# Patient Record
Sex: MALE | Race: WHITE | NOT HISPANIC OR LATINO | Employment: OTHER | URBAN - METROPOLITAN AREA
[De-identification: names, ages, dates, MRNs, and addresses within clinical notes are randomized per-mention and may not be internally consistent; named-entity substitution may affect disease eponyms.]

---

## 2018-09-25 VITALS — HEIGHT: 71 IN | BODY MASS INDEX: 39.76 KG/M2 | WEIGHT: 284 LBS

## 2019-01-01 ENCOUNTER — DOCUMENTATION ONLY (OUTPATIENT)
Dept: TRANSPLANT | Facility: CLINIC | Age: 49
End: 2019-01-01

## 2019-01-01 ENCOUNTER — TELEPHONE (OUTPATIENT)
Dept: TRANSPLANT | Facility: CLINIC | Age: 49
End: 2019-01-01

## 2019-01-01 ENCOUNTER — HOSPITAL ENCOUNTER (OUTPATIENT)
Dept: RADIOLOGY | Facility: HOSPITAL | Age: 49
Discharge: HOME | End: 2019-06-20
Attending: NURSE PRACTITIONER
Payer: MEDICARE

## 2019-01-01 ENCOUNTER — SOCIAL WORK (OUTPATIENT)
Dept: TRANSPLANT | Facility: CLINIC | Age: 49
End: 2019-01-01

## 2019-01-01 ENCOUNTER — TELEPHONE (OUTPATIENT)
Dept: INFECTIOUS DISEASES | Facility: HOSPITAL | Age: 49
End: 2019-01-01

## 2019-01-01 ENCOUNTER — HOSPITAL ENCOUNTER (OUTPATIENT)
Dept: RADIOLOGY | Facility: HOSPITAL | Age: 49
Discharge: HOME | End: 2019-08-12
Attending: NURSE PRACTITIONER
Payer: MEDICARE

## 2019-01-01 ENCOUNTER — CLINICAL SUPPORT (OUTPATIENT)
Dept: INFECTIOUS DISEASES | Facility: CLINIC | Age: 49
End: 2019-01-01
Payer: MEDICARE

## 2019-01-01 ENCOUNTER — OFFICE VISIT (OUTPATIENT)
Dept: TRANSPLANT | Facility: CLINIC | Age: 49
End: 2019-01-01
Payer: MEDICARE

## 2019-01-01 VITALS
OXYGEN SATURATION: 99 % | HEART RATE: 106 BPM | RESPIRATION RATE: 18 BRPM | DIASTOLIC BLOOD PRESSURE: 75 MMHG | BODY MASS INDEX: 32.73 KG/M2 | WEIGHT: 228.63 LBS | HEIGHT: 70 IN | SYSTOLIC BLOOD PRESSURE: 115 MMHG | TEMPERATURE: 98 F

## 2019-01-01 DIAGNOSIS — I10 ESSENTIAL HYPERTENSION: ICD-10-CM

## 2019-01-01 DIAGNOSIS — Z79.4 TYPE 2 DIABETES MELLITUS WITH COMPLICATION, WITH LONG-TERM CURRENT USE OF INSULIN: ICD-10-CM

## 2019-01-01 DIAGNOSIS — Z01.818 PRE-TRANSPLANT EVALUATION FOR CHRONIC KIDNEY DISEASE: Primary | ICD-10-CM

## 2019-01-01 DIAGNOSIS — Z76.82 ORGAN TRANSPLANT CANDIDATE: ICD-10-CM

## 2019-01-01 DIAGNOSIS — Z01.818 PRE-TRANSPLANT EVALUATION FOR CHRONIC KIDNEY DISEASE: ICD-10-CM

## 2019-01-01 DIAGNOSIS — Z51.89 ENCOUNTER FOR BLOOD TRANSFUSION: ICD-10-CM

## 2019-01-01 DIAGNOSIS — E11.21 DIABETIC NEPHROPATHY ASSOCIATED WITH TYPE 2 DIABETES MELLITUS: ICD-10-CM

## 2019-01-01 DIAGNOSIS — Z76.82 ORGAN TRANSPLANT CANDIDATE: Primary | ICD-10-CM

## 2019-01-01 DIAGNOSIS — R76.11 POSITIVE PPD: ICD-10-CM

## 2019-01-01 DIAGNOSIS — I82.401 ACUTE DEEP VEIN THROMBOSIS (DVT) OF RIGHT LOWER EXTREMITY, UNSPECIFIED VEIN: ICD-10-CM

## 2019-01-01 DIAGNOSIS — E11.8 TYPE 2 DIABETES MELLITUS WITH COMPLICATION, WITH LONG-TERM CURRENT USE OF INSULIN: ICD-10-CM

## 2019-01-01 DIAGNOSIS — N18.6 ESRD (END STAGE RENAL DISEASE): ICD-10-CM

## 2019-01-01 PROCEDURE — 72170 X-RAY EXAM OF PELVIS: CPT | Mod: TC,TXP

## 2019-01-01 PROCEDURE — 93978 US DOPP ILIACS BILATERAL: ICD-10-PCS | Mod: 26,TXP,, | Performed by: RADIOLOGY

## 2019-01-01 PROCEDURE — 99215 OFFICE O/P EST HI 40 MIN: CPT | Mod: PBBFAC,25,TXP | Performed by: INTERNAL MEDICINE

## 2019-01-01 PROCEDURE — 74176 CT ABDOMEN PELVIS WITHOUT CONTRAST: ICD-10-PCS | Mod: 26,TXP,, | Performed by: RADIOLOGY

## 2019-01-01 PROCEDURE — 71046 XR CHEST PA AND LATERAL: ICD-10-PCS | Mod: 26,TXP,, | Performed by: RADIOLOGY

## 2019-01-01 PROCEDURE — 99203 OFFICE O/P NEW LOW 30 MIN: CPT | Mod: S$PBB,TXP,, | Performed by: TRANSPLANT SURGERY

## 2019-01-01 PROCEDURE — 71046 X-RAY EXAM CHEST 2 VIEWS: CPT | Mod: TC,TXP

## 2019-01-01 PROCEDURE — 74176 CT ABD & PELVIS W/O CONTRAST: CPT | Mod: TC,TXP

## 2019-01-01 PROCEDURE — G0009 ADMIN PNEUMOCOCCAL VACCINE: HCPCS | Mod: PBBFAC,TXP

## 2019-01-01 PROCEDURE — 76770 US EXAM ABDO BACK WALL COMP: CPT | Mod: TC,TXP

## 2019-01-01 PROCEDURE — 99204 OFFICE O/P NEW MOD 45 MIN: CPT | Mod: S$PBB,TXP,, | Performed by: PHYSICIAN ASSISTANT

## 2019-01-01 PROCEDURE — 99203 PR OFFICE/OUTPT VISIT, NEW, LEVL III, 30-44 MIN: ICD-10-PCS | Mod: S$PBB,TXP,, | Performed by: TRANSPLANT SURGERY

## 2019-01-01 PROCEDURE — 99205 PR OFFICE/OUTPT VISIT, NEW, LEVL V, 60-74 MIN: ICD-10-PCS | Mod: S$PBB,TXP,, | Performed by: INTERNAL MEDICINE

## 2019-01-01 PROCEDURE — 99204 PR OFFICE/OUTPT VISIT, NEW, LEVL IV, 45-59 MIN: ICD-10-PCS | Mod: S$PBB,TXP,, | Performed by: PHYSICIAN ASSISTANT

## 2019-01-01 PROCEDURE — 72170 XR PELVIS ROUTINE AP: ICD-10-PCS | Mod: 26,TXP,, | Performed by: RADIOLOGY

## 2019-01-01 PROCEDURE — 76770 US EXAM ABDO BACK WALL COMP: CPT | Mod: 26,TXP,, | Performed by: RADIOLOGY

## 2019-01-01 PROCEDURE — 93978 VASCULAR STUDY: CPT | Mod: TC,TXP

## 2019-01-01 PROCEDURE — 99999 PR PBB SHADOW E&M-EST. PATIENT-LVL V: ICD-10-PCS | Mod: PBBFAC,TXP,, | Performed by: INTERNAL MEDICINE

## 2019-01-01 PROCEDURE — 93978 VASCULAR STUDY: CPT | Mod: 26,TXP,, | Performed by: RADIOLOGY

## 2019-01-01 PROCEDURE — 76770 US RETROPERITONEAL COMPLETE: ICD-10-PCS | Mod: 26,TXP,, | Performed by: RADIOLOGY

## 2019-01-01 PROCEDURE — 99205 OFFICE O/P NEW HI 60 MIN: CPT | Mod: S$PBB,TXP,, | Performed by: INTERNAL MEDICINE

## 2019-01-01 PROCEDURE — 90471 IMMUNIZATION ADMIN: CPT | Mod: PBBFAC,TXP

## 2019-01-01 PROCEDURE — 74176 CT ABD & PELVIS W/O CONTRAST: CPT | Mod: 26,TXP,, | Performed by: RADIOLOGY

## 2019-01-01 PROCEDURE — 71046 X-RAY EXAM CHEST 2 VIEWS: CPT | Mod: 26,TXP,, | Performed by: RADIOLOGY

## 2019-01-01 PROCEDURE — 72170 X-RAY EXAM OF PELVIS: CPT | Mod: 26,TXP,, | Performed by: RADIOLOGY

## 2019-01-01 PROCEDURE — 99999 PR PBB SHADOW E&M-EST. PATIENT-LVL V: CPT | Mod: PBBFAC,TXP,, | Performed by: INTERNAL MEDICINE

## 2019-01-01 RX ORDER — AMLODIPINE BESYLATE 10 MG/1
10 TABLET ORAL DAILY
Refills: 3 | COMMUNITY
Start: 2019-01-01

## 2019-01-01 RX ORDER — INSULIN ASPART 100 [IU]/ML
INJECTION, SOLUTION INTRAVENOUS; SUBCUTANEOUS
COMMUNITY

## 2019-01-01 RX ORDER — PROMETHAZINE HYDROCHLORIDE 12.5 MG/1
TABLET ORAL
Refills: 0 | COMMUNITY
Start: 2019-04-04

## 2019-01-01 RX ORDER — FLUOXETINE HYDROCHLORIDE 40 MG/1
40 CAPSULE ORAL DAILY
Refills: 3 | COMMUNITY
Start: 2019-01-01

## 2019-01-01 RX ORDER — PREGABALIN 100 MG/1
CAPSULE ORAL
Refills: 0 | COMMUNITY
Start: 2019-01-01

## 2019-01-01 RX ORDER — CALCIUM ACETATE 667 MG/1
3 CAPSULE ORAL
COMMUNITY
Start: 2019-01-01

## 2019-01-01 RX ORDER — GABAPENTIN 300 MG/1
300 CAPSULE ORAL DAILY
COMMUNITY

## 2019-01-01 RX ORDER — CALCITRIOL 0.5 UG/1
0.5 CAPSULE ORAL DAILY
Refills: 99 | COMMUNITY
Start: 2019-04-03

## 2019-01-01 RX ORDER — METOPROLOL TARTRATE 50 MG/1
50 TABLET ORAL 2 TIMES DAILY
COMMUNITY

## 2019-01-01 RX ORDER — PANTOPRAZOLE SODIUM 40 MG/1
40 TABLET, DELAYED RELEASE ORAL DAILY
Refills: 5 | COMMUNITY
Start: 2019-01-01

## 2019-01-01 RX ORDER — HYDROCODONE BITARTRATE AND ACETAMINOPHEN 10; 325 MG/1; MG/1
1 TABLET ORAL 2 TIMES DAILY
Refills: 0 | COMMUNITY
Start: 2019-01-01

## 2019-01-01 RX ORDER — FUROSEMIDE 80 MG/1
80 TABLET ORAL 2 TIMES DAILY
Refills: 3 | COMMUNITY
Start: 2019-01-01

## 2019-04-18 ENCOUNTER — TELEPHONE (OUTPATIENT)
Dept: TRANSPLANT | Facility: CLINIC | Age: 49
End: 2019-04-18

## 2019-06-11 PROBLEM — N18.6 ESRD (END STAGE RENAL DISEASE) ON DIALYSIS: Status: ACTIVE | Noted: 2019-01-01

## 2019-06-11 PROBLEM — Z99.2 ESRD (END STAGE RENAL DISEASE) ON DIALYSIS: Status: ACTIVE | Noted: 2019-01-01

## 2019-06-11 PROBLEM — Z79.4 TYPE 2 DIABETES MELLITUS WITH COMPLICATION, WITH LONG-TERM CURRENT USE OF INSULIN: Status: ACTIVE | Noted: 2019-01-01

## 2019-06-11 PROBLEM — I10 ESSENTIAL HYPERTENSION: Status: ACTIVE | Noted: 2019-01-01

## 2019-06-11 PROBLEM — E11.8 TYPE 2 DIABETES MELLITUS WITH COMPLICATION, WITH LONG-TERM CURRENT USE OF INSULIN: Status: ACTIVE | Noted: 2019-01-01

## 2019-06-20 PROBLEM — E11.21 DIABETIC NEPHROPATHY ASSOCIATED WITH TYPE 2 DIABETES MELLITUS: Status: ACTIVE | Noted: 2019-01-01

## 2019-06-20 NOTE — PROGRESS NOTES
"   Transplant Nephrology  Kidney Transplant Recipient Evaluation    Referring Physician: Alexey Almazan  Current Nephrologist: Alexey Almazan    Subjective:   CC:  Initial evaluation of kidney transplant candidacy.    HPI:  Mr. Oneill is a 49 y.o. year old White male who has presented to be evaluated as a potential kidney transplant recipient.  He has ESRD secondary to diabetic nephropathy.  Patient is currently on peritoneal dialysis started on 1/2016. Patient is dialyzing on cyclic peritoneal dialysis.  Patient reports that he is tolerating dialysis well. . He has a PD catheter for dialysis access.     Patient he was diagnosed with DM at age 17. He was on medications for 20+ yrs, insulin dependent since 2010, at age 40. Currently he has an insulin pump with an A1c 6.1%.    He was diagnosed with Hypertension in the 20's. BP was well controlled.     [place]. At that time he was coming back to the [place] 3 to 4 times a year and he was following with his PCP in [place]. He was told that his DM was well controlled    He was diagnosed with proteinuria since he was 20's. He was diagnosed with decline GFR in 2016 when his kidney function was severely compromised. He was hospitalized received a tunneled catheter and was put on dialysis    He is doing fine with PD, initially on HD. He had 2 peritonitis in the past the first time over 6 months ago (2018) that improved with IP antibiotics, no need to remove catheter. The first time likely soon after initiation (2017) and the catheter had to be removed     He had a GI bleed in 2010, received 3 units of PRBC, he had an upper endoscopy and found with a gastric ulcer. He was put on PPI and Carafate.     In 2015 he had other episode of upper GI bleed. He said that "the gastric bypass was bleeding". No other episodes of GI bleed. He told me that he had other survey endoscopy 2 months ago. April 2019. Will get records from this " "endoscopy.    Currently he stays more or less active. He uses a cane due to some imbalance associated with neuropathy. He is able to walk up to 1 hr in [employer], he does not exercise. He walk up 2 flight of stairs. He does not need 02. He does not need a wheel chair.      He does not have any donors            Previous Transplant: no    Past Medical and Surgical History: Mr. Oneill  has a past medical history of Deep vein thrombosis, Depression, Encounter for blood transfusion, Hypertension, and Obesity.  He has a past surgical history that includes Peritoneal catheter insertion; Gastric bypass; Cholecystectomy; Eye surgery; AV fistula placement (Left); and gastric  ulcerer cautherized endoscopically.    Past Social and Family History: Mr. Oneill reports that he has never smoked. He does not have any smokeless tobacco history on file. He reports that he drinks alcohol. He reports that he does not use drugs. His family history includes Cancer (age of onset: 39) in his father; Diabetes in his brother and mother; Heart disease in his mother; No Known Problems in his sister; Stroke in his mother.    Review of Systems     Skin: no skin rash  CNS; no headaches, blurred vision, seizure, or syncope  ENT: No JVD,  Adenopathies,  nasal congestion. No oral lesions  Cardiac: No chest pain, dyspnea, claudication, edema or palpitations  Respiratory: No SOB, cough, hemoptysis   Gastro-intestinal: No diarrhea, constipation, abdominal pain, nausea, vomit. No ascitis  Genitourinary: no hematuria, dysuria, frequency, frequency  Musculoskeletal: joint pain, arthritis or vasculitic changes  Psych: alert awake, oriented, No cranial nerves deficit.      Objective:   Blood pressure 115/75, pulse 106, temperature 98.2 °F (36.8 °C), temperature source Oral, resp. rate 18, height 5' 10.08" (1.78 m), weight 103.7 kg (228 lb 9.9 oz), SpO2 99 %.body mass index is 32.73 kg/m².    Physical Exam   He does not look very debilitated and his " functional capacity is acceptable. He uses a cane to improve balance.   Head: normocephalic  Neck: No JVD, cervical axillary, or femoral adenopathies  Heart: no murmurs, Normal s1 and s2, No gallops, no rubs, No murmurs  Lungs; CTA, good respiratory effort, no crackles  Abdomen: soft, non tender, no splenomegaly or hepatomegaly, no massess, no bruits. PD catheter in place.   Extremities: No edema, skin rash, joint pain  SNC: awake, alert oriented. Cranial nerves are intact, no focalized, sensitivity and strength preserved      Labs:  No results found for: WBC, HGB, HCT, LABPLAT, NA, K, CL, CO2, BUN, CREATININE, EGFRNONAA, EGFRIFAFRICA, GLUCOSE, CALCIUM, PHOS, MG, ALBUMIN, AST, ALT, [id number], UTPCR, PTH, TACROLIMUS, CYCLOSPORINE, SIROLIMUSLEV    No results found for: PREALBUMIN, BILIRUBINUA, GGT, AMYLASE, LIPASE, PROTEINUA, NITRITE, RBCUA, WBCUA    No results found for: HLAABCTYPE    Labs were reviewed with the patient.    Assessment:     1. ESRD (end stage renal disease)    2. Encounter for blood transfusion    3. Acute deep vein thrombosis (DVT) of right lower extremity, unspecified vein    4. Type 2 diabetes mellitus with complication, with long-term current use of insulin    5. Diabetic nephropathy associated with type 2 diabetes mellitus    6. BMI 33.0-33.9,adult    7. Essential hypertension        Plan:     Transplant Candidacy:   Based on available information, Mr. Oneill is a suitable kidney transplant candidate.   Meets center eligibility for accepting HCV+ donor offer - yes.  Patient educated on HCV+ donors. Marko is willing to accept HCV+ donor offer - yes   Patient is a candidate for KDPI > 85 kidney donor offer - no.  Final determination of transplant candidacy will be made once workup is complete and reviewed by the selection committee.    We spoke about living donation: strongly advised    Cardiology consult  h/o due to his family history and DM since age 15    His father has h/o Lung cancer at early  age, currently there is no guideline suggesting low dose chest CT based on family history.     Will discuss with transplant surgery if any need to screen for pancreatic cancer due to his h/o on his father. I spoke with transplant surgery in clinic and there is no indication to screen for pancreatic cancer on this patient with father with diagnosis of pancreatic cancer.     Wt loss. He is doing a great job so far he lost 50 lb to be considered for transplantation.     Will get clearance from his GI doctor due to his history of bleeding from a gastric ulcer after thye gastric bypass x episodes requiring blood transfusion. Also will request a copy of his last upper GI 3 months ago April 2019.     Extensive education provided    All questions answered     We spoke for 1 hr. Data available was reviewed     Teo Pollock MD       [facility] Patient Status  Functional Status: 60% - Requires occasional assistance but is able to care for needs  Physical Capacity: Limited Mobility

## 2019-06-20 NOTE — PROGRESS NOTES
Transplant Recipient Adult Psychosocial Assessment    Marko Oneill  [address]  [facility] [address]  Telephone Information:   Mobile [phone]   Home  [phone] (home)  Work  [phone] (work)  E-mail  No e-mail address on record    Sex: male  YOB: 1970  Age: 49 y.o.    Encounter Date: 2019  [place] Citizen: yes  Primary Language: English   Needed: no   Transportation: pt reports does drive/own car for all appointments    Emergency Contact:  Martha Oneill, 43 yo spouse, [facility], does drive/own car, works full time at [employer]. [phone]    Family/Social Support:   Number of dependents/: pt denies  Marital history:  to Martha for 7 years  Other family dynamics: Pt reports father is . Pt reports mother Reina is living well, is active and living in [facility].  Pt reports lives with his supportive full time employed wife Martha and their friend Bhavik Méndez in [place]. 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 Pt reports is coping well overall and PTSD is being managed well with medicines prescribed by PCP. Pt reports 2 siblings, sister and brother, are supportive and living in [facility]. Pt's wife Martha in pretransplant appointments and reports will be pt's primary organ transplant caregiver and friend Bhavik and mother Reina will be back up organ transplant caregivers.    Household Composition:  Martha Oneill, 43 yo spouse, [facility], does drive/own car, works full time at [employer]. [phone]  Bhavik Méndez, 33 yo friend, [facility] (lives with pt/wife), does drive/own car. [phone]    Do you and your caregivers have access to reliable transportation? yes  PRIMARY CAREGIVER: Martha Oneill, wife, will be primary  caregiver, phone number [phone].     provided in-depth information to patient and caregiver regarding pre- and post-transplant caregiver role.   strongly encourages patient and caregiver to have concrete plan regarding post-transplant care giving, including back-up caregiver(s) to ensure care giving needs are met as needed.    Patient and Caregiver states understanding all aspects of caregiver role/commitment and is able/willing/committed to being caregiver to the fullest extent necessary.    Patient and Caregiver verbalizes understanding of the education provided today and caregiver responsibilities.         remains available. Patient and Caregiver agree to contact  in a timely manner if concerns arise.      Able to take time off work without financial concerns: yes. Martha reports has FMLA, personal leave and administrative leave all available to her for pt's organ transplant.    Additional Significant Others who will Assist with Transplant:  Bhavik Méndez, 35 yo friend, [place] [facility] (lives with pt/wife), does drive/own car. [phone]  Reina Curry, 74 yo mother, [facility], does drive/own car. [phone]    Living Will: no  Healthcare Power of : no  Advance Directives on file: <<no information> per medical record.  Verbally reviewed LW/HCPA information.   provided patient with copy of LW/HCPA documents and provided education on completion of forms.    Living Donors: Education and resource information given to patient.    Highest Education Level: Attended College/Technical School EMT (2 years training)  Reading Ability: college  Reports difficulty with: reading and seeing -- needs glasses to read. Pt denies any problems learning new information.  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     Spouse/Significant Other Employment: works full time at [employer]    Disabled: Yes 2010 due to combat related PTSD. Pt reports also on Dialysis and is IDDM    Monthly Income:  Pt reports ZERO income. Pt reports household income from wife's earned income --  $8,000 per month  Able to afford all costs now and if transplanted, including medications: yes Pt reports having good prescription medical insurance. Pt's wife reports [employer] medical insurance pays for $5000 of transplant travel, meals and lodging however, this is not stated in transplant financial sheet in chart. Pt's wife reports pt has separate [facility] Critical Illness policy which will assist after transplant (paperwork will be needed post transplant to access the Critical Illness benefits).  Patient and Caregiver verbalizes understanding of personal responsibilities related to transplant costs and the importance of having a financial plan to ensure that patients transplant costs are fully covered.       provided fundraising information/education. Patient and Caregiververbalizes understanding.   remains available.    Insurance:   Payor/Plan Subscr  Sex Relation Sub. Ins. ID Effective Group Num   1. MEDICARE - [place]* MAGDALENA KUNZ 1970 Male Self [id number]                                    [address]   2. UNITED MEDICA* RUSLAN KUNZ 10/10/1975 Female  [id number] [birth year] [id number]                                   [address] [address]     Primary Insurance (for UNOS reporting): Public Insurance - Medicare FFS (Fee For Service)  Secondary Insurance (for UNOS reporting): Private Insurance through wife's employer.   Patient and Caregiver verbalizes clear understanding that patient may experience difficulty  obtaining and/or be denied insurance coverage post-surgery. This includes and is not limited to disability insurance, life insurance, health insurance, burial insurance, long term care insurance, and other insurances.      Patient and Caregiver also reports understanding that future health concerns related to or unrelated to transplantation may not be covered by patient's insurance.  Resources and information provided and reviewed.     Patient and Caregiver provides verbal permission to release any necessary information to outside resources for patient care and discharge planning.  Resources and information provided are reviewed.      [facility] [phone]. PD (Kary nurse)    Dialysis Adherence: Patient and Caregiver reports high dialysis compliance over last three months with 0 AMA, 0 no shows, and 0 psychosocial issues within last 3 months.    Infusion Service: patient utilizing? no  Home Health: patient utilizing? no  DME: yes 3 prong cane, insulin pump, PD equipment and supplies  Pulmonary/Cardiac Rehab: pt denies  ADLS:  Independent with self care and medication management    Adherence:   Pt reports having high adherence to medical appointments and instructions.  Adherence education and counseling provided.     Per History Section:  Past Medical History:   Diagnosis Date    BMI 33.0-33.9,adult 6/20/2019    Deep vein thrombosis     DVT left lower extremety- They say travel related 15 hr flight     Depression     PTSD associated with combat stress in [place]    Diabetic nephropathy associated with type 2 diabetes mellitus 6/20/2019    Encounter for blood transfusion     last time in 2015. x3    Hypertension     Obesity      Social History     Tobacco Use    Smoking status: Never Smoker   Substance Use Topics    Alcohol use: Yes     Comment: once a month     Social History     Substance and Sexual Activity   Drug Use Never    Comment: he uses medical marijuana     Social History  "    Substance and Sexual Activity   Sexual Activity Not on file       Per Today's Psychosocial:  Please review above table for pt's substance use history.    Patient and Caregiver states clear understanding of the potential impact of substance use as it relates to transplant candidacy and is aware of possible random substance screening.  Substance abstinence/cessation counseling, education and resources provided and reviewed.     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 Pt reports PTSD symptoms (mood swings, flashbacks, problems with "hyperawareness") are being well managed by Prozac and medical marijuana (pt reprots "eats the prescription marijuana"). Pt reports has been followed by local psychiatrist Dr. Tariq Sanders in the past however, pt's psychiatrist has retired and pt reports is currently being treated by his PCP Dr. Jr Carter with no issues. Pt reports excellent support system in place. Mental health clearance letter from pt's PCP Dr. Jr Carter has been requested. Transplant nephrologist and transplant nurse coordinator notified of above via Epic message.   Psychiatrist/Counselor: none. PCP Dr. Jr Cleveland assisting with medication prescriptions and support  Medications:  Prozac 40 mg and medical marijuana  Suicide/Homicide Issues: pt denies history of si/hi  Safety at home: pt reports living in safe home environment    Knowledge: Patient and Caregiver states having clear understanding and realistic expectations regarding the potential risks and potential benefits of organ transplantation and organ donation and agrees to discuss with health care team members and support system members, as well as to utilize available resources and express questions and/or concerns in order to further facilitate the pt informed decision-making.  " Resources and information provided and reviewed.    Patient and Caregiver is aware of [facility]'s affiliation and/or partnership with agencies in home health care, LTAC, SNF, [facility], and other hospitals and clinics.    Understanding: Patient and Caregiver reports having a clear understanding of the many lifetime commitments involved with being a transplant recipient, including costs, compliance, medications, lab work, procedures, appointments, concrete and financial planning, preparedness, timely and appropriate communication of concerns, abstinence (ETOH, tobacco, illicit non-prescribed drugs), adherence to all health care team recommendations, support system and caregiver involvement, appropriate and timely resource utilization and follow-through, mental health counseling as needed/recommended, and patient and caregiver responsibilities.  Social Service Handbook, resources and detailed educational information provided and reviewed.  Educational information provided.    Patient and Caregiver also reports current and expected compliance with health care regime and states having a clear understanding of the importance of compliance.      Patient and Caregiver reports a clear understanding that risks and benefits may be involved with organ transplantation and with organ donation.       Patient and Caregiver also reports clear understanding that psychosocial risk factors may affect patient, and include but are not limited to feelings of depression, generalized anxiety, anxiety regarding dependence on others, post traumatic stress disorder, feelings of guilt and other emotional and/or mental concerns, and/or exacerbation of existing mental health concerns.  Detailed resources provided and discussed.      Patient and Caregiver agrees to access appropriate resources in a timely manner as needed and/or as recommended, and to communicate concerns appropriately.  Patient and Caregiver also reports a clear understanding of  treatment options available.     Patient and Caregiver received education in a group setting.   reviewed education, provided additional information, and answered questions.    Feelings or Concerns: pt reports high motivation to pursue organ transplant. Pt reports is listed at [facility] and is being evaluated for transplant listing at [facility]. \[facility].\"".    Coping: pt reports is coping well over all with kidney disease and need for organ transplant. Pt reports johny best through support from family and friends. Pt reports enjoys playing with dogs and going fishing and hunting.    Goals: Pt reports hope for successful organ transplant so he may discontinue dialysis and have more control over his schedule.  Patient referred to Vocational Rehabilitation.    Interview Behavior: Patient and Caregiver presents as alert and oriented x 4, pleasant, good eye contact, well groomed, recall good, concentration/judgement good, average intelligence, calm, communicative, cooperative and asking and answering questions appropriately. Pt's supportive wife Martha in assessment with pt's permission.         Transplant Social Work - Candidacy  Assessment/Plan:     Psychosocial Suitability: Patient presents as an unacceptable candidate for kidney transplant at this time. Due to pt's significant mental health history and treatment for PTSD and need for continued mental health follow up with medical provider, mental health clearance letter is needed and has been requested by pt's local medical provider Dr. Jr Carter. Pt reports having organ transplant caregiver/transportation plan, medical insurance plan, and plan to afford transplant costs all in place. Pt reports adherence to dialysis treatment and instructions.     Recommendations/Additional Comments: Pt reports having combat related PTSD, is being treated by PCP, and organ transplant mental health clearance letter has been requested; pt to FAX/provide transplant  mental health clearance letter to transplant . Dialysis compliance update needed and form sent to unit for completion.  Pt reports lives away and will need transplant lodging; lodging information provided in detail. Pt reports earning no income himself; reports depends on wife's earned income $8000 per month for all expenses.  Pt reports having [employer] Critical Illness policy benefits for post transplant needs. 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 Pt reports usually obtains out pt prescriptions from [facility] and reports is in agreement to using [facility] if insurance allows.    Final determination of transplant candidacy will be made once work up is complete and reviewed by the selection committee.    Kamala Obando MSW LCSW

## 2019-06-20 NOTE — PROGRESS NOTES
Pre Transplant Infectious Diseases Consult  Kidney Transplant Recipient Evaluation    Reason for Visit:  Pre Transplant Evaluation    Mr. Oneill is a 49 year old male who has presented to be evaluated as a potential kidney transplant recipient.  He has ESRD secondary to diabetic nephropathy.  Patient is currently on peritoneal dialysis. He is tolerating dialysis well. Reports history of PD related peritonitis in 2017 requiring hospitalization for 14 days. His PD catheter was ultimately exchanged and infection resolved. He then developed another superficial infection surrounding his PD catheter last year. He was treated with two weeks of oral antibiotics for a staph infection. No catheter exchange required. He has had no infectious issues with his catheter since. Denies recent fevers, chills or infectious illnesses.     Infectious History:  Current/recent infections or currently taking antibiotics?  No  History of recurrent infections? No  History of prior infections? 1/2019 - viral bronchitis and post viral pneumonia.   Any major hospitalizations due to infection?  Yes - PD catheter related peritonitis  History of Diabetes/diabetic foot infection/osteomyelitis?  Yes. He has issues with open wounds on his feet. He completed Augmentin a few months ago for a dibetic foot infection. He is established with a podiatrist. Currently has wounds on his feet that are not infected.  History of shingles?  Yes - twice   History of STDs (syphilis, viral hepatitis, HIV)?  No  Exposure to TB or ever had a positive TB skin test?  Yes. 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 Prior CXRx clear. Never had active Tb related symptoms. Took INH and B6 for a few weeks in the past that were stopped once CXR returned negative.   History of residence in coccidioides endemic areas ([facility] [place]?  No  Any foreign travel?  Yes - [place], [place], [place], [place], [place]. No travel related infections.    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  Yes  Any injectable or smoked recreational drug use?  No    Immunization History:  Childhood vaccines:  Yes  Last Flu shot: 9/2018  Tetanus/TDAP: 9/2018  Hepatitis A: never  Hepatitis B: completed series in 1997.  immune  Prevnar-13: never  Pneumovax-23: 9/2017  Shingles (Zostavax/Shingrix): never  Meningococcal: yes  Other: MMR    Serologies:  No results found for: CMVIGGINTERP, HEPAIGG, HEPBCAB, HEPBSAB, HEPBSAG, HEPBCAB, ECH91RTUD, TBGOLDPLUS, RPR, STRONGANTIGG, TOXOIGG, TOXOG, VARICELLAINT     Review of Systems   Constitution: Negative for chills, decreased appetite, fever, malaise/fatigue and night sweats.   HENT: Negative for congestion, ear pain, hearing loss, hoarse voice, sore throat and tinnitus.    Eyes: Negative for blurred vision, redness and visual disturbance.   Cardiovascular: Negative for chest pain, leg swelling and palpitations.   Respiratory: Negative for cough, hemoptysis, shortness of breath, sputum production and wheezing.    Endocrine: Negative for cold intolerance and heat intolerance.   Hematologic/Lymphatic: Negative for adenopathy. Does not bruise/bleed easily.   Skin: Negative for dry skin, itching, rash and suspicious lesions.   Musculoskeletal: Positive for joint pain. Negative for back pain, myalgias and neck pain.   Gastrointestinal: Negative for abdominal pain, constipation, diarrhea, heartburn, nausea and vomiting.   Genitourinary: Positive for hesitancy. Negative for dysuria, flank pain, frequency, hematuria and urgency.   Neurological: Positive for numbness and paresthesias. Negative for dizziness, headaches and weakness.   Psychiatric/Behavioral: Positive for depression. Negative for memory loss.  The patient has insomnia. The patient is not nervous/anxious.    Allergic/Immunologic: Negative for environmental allergies, HIV exposure, hives and persistent infections.     Physical Exam   Constitutional: He is oriented to person, place, and time. He appears well-developed and well-nourished. No distress.   HENT:   Head: Normocephalic and atraumatic.   Mouth/Throat: No oropharyngeal exudate.   Eyes: Conjunctivae are normal. No scleral icterus.   Neck: Normal range of motion. Neck supple.   Cardiovascular: Normal rate, regular rhythm, normal heart sounds and intact distal pulses.   No murmur heard.  Pulmonary/Chest: Effort normal and breath sounds normal. No respiratory distress. He has no wheezes.   Abdominal: Soft. Bowel sounds are normal. He exhibits no distension. There is no tenderness. There is no guarding.   PD catheter c/d/i   Musculoskeletal: Normal range of motion. He exhibits no edema, tenderness or deformity.   Lymphadenopathy:     He has no cervical adenopathy.   Neurological: He is alert and oriented to person, place, and time. No cranial nerve deficit.   Skin: Skin is warm and dry. No rash noted. He is not diaphoretic.   Crack on left heel. Left plantar foot wound bandaged. No drainage or signs of infection.   Psychiatric: He has a normal mood and affect. His behavior is normal. Judgment and thought content normal.   Vitals reviewed.           Counseling:   I discussed with the patient the risk for increased susceptibility to infections following transplantation including increased risk for infection right after transplant and if rejection should occur.  The patient has been counseled on the importance of vaccinations including but not limited to a yearly flu vaccine. Patient was also instructed to encourage that family/caretakers receive their flu vaccine yearly.    Specific guidance has been provided to the patient regarding the patient's occupation, hobbies and activities to avoid future  infectious complications. These include but are not limited to: avoiding raw/undercooked meats and seafood, avoiding unpasteurized milk/cheeses, proper (hand) hygiene, contact with animals and appropriate vaccination of animals, use of mosquito/tick precautions, avoiding walking barefoot, avoiding sick contacts, and seeking medical advice prior to foreign travel (specifically developing countries).     Transplant Candidacy: Based on available information, there are no identified significant barriers to transplantation from an infectious disease standpoint pending acceptable serologies and the following. Recommend podiatry evaluation prior to transplant. He has a history of positive PPD skin tests with negative CXR. No evidence of active Tb infection. Given possible occupational exposure in anticipation for transplant, may need treatment for latent Tb. Quantiferon gold ordered.    Immunizations recommended:  - Influenza annually  - Tdap booster every 10 years  - Prevnar 13 today followed by Pneumovax 23 booster every 5 years  - Hepatitis A vaccine today and in 6 months  - Shingrix at age 50 or sooner if approved by insurance company        Final determination of transplant candidacy will be made once evaluation is complete and reviewed by the Transplant Selection Committee.

## 2019-06-20 NOTE — PROGRESS NOTES
Transplant Surgery  Kidney Transplant Recipient Evaluation    Referring Physician: Alexey Almazan  Current Nephrologist: Alexey Almazan    Subjective:     Reason for Visit: evaluate transplant candidacy    History of Present Illness: Marko Oneill is a 49 y.o. year old male undergoing transplant evaluation.    Dialysis History: Marko is on peritoneal dialysis.      Transplant History: N/A    Etiology of Renal Disease: Diabetes Mellitus - Type Other / Unknown (based on medical records from referral).    Review of Systems    Objective:     Physical Exam:  Constitutional:   Vitals reviewed: yes   Well-nourished and well-groomed: yes  Eyes:   Sclerae icteric: no   Extraocular movements intact: yes  GI:    Bowel sounds normal: yes   Tenderness: no    If yes, quadrant/location: not applicable   Palpable masses: no    If yes, quadrant/location: not applicable   Hepatosplenomegaly: no   Ascites: no   Hernia: no    If yes, type/location: not applicable   Surgical scars: yes    If yes, type/location: laparoscopic port sites  Resp:   Effort normal: yes   Breath sounds normal: yes    CV:   Regular rate and rhythm: yes   Heart sounds normal: yes   Femoral pulses normal: yes   Extremities edematous: no  Skin:   Rashes or lesions present: no    If yes, describe:not applicable   Jaundice:: no    Musculoskeletal:   Gait normal: yes   Strength normal: yes  Psych:   Oriented to person, place, and time: yes   Affect and mood normal: yes    Additional comments: not applicable    Counseling: We provided Marko Oneill with a group education session today.  We discussed kidney transplantation at length with him, including risks, potential complications, and alternatives in the management of his renal failure.  The discussion included complications related to anesthesia, bleeding, infection, primary nonfunction, and ATN.  I discussed the typical postoperative course, length of hospitalization, the need for long-term immunosuppression, and  the need for long-term routine follow-up.  I discussed living-donor and -donor transplantation and the relative advantages and disadvantages of each.  I also discussed average waiting times for both living donation and  donation.  I discussed national and center-specific survival rates.  I also mentioned the potential benefit of multicenter listing to candidates listed with centers within more than one organ procurement organization.  All questions were answered.    Final determination of transplant candidacy will be made once evaluation is complete and reviewed by the Kidney & Kidney/Pancreas Selection Committee.         Transplant Surgery - Candidacy   Assessment/Plan:   Marko Oneill has end stage renal disease (ESRD) on dialysis. I see no surgical contraindication to placing a kidney transplant. Based on available information, Marko Oneill is a suitable kidney transplant candidate.     Wu Philippe MD

## 2019-06-20 NOTE — TELEPHONE ENCOUNTER
Reviewed pt transplant labs.  Pt to continue to follow-up with dialysis unit dietitians recommendations.  Fax sent requesting most recent nutrition note from dialysis unit RD.  Once this note is received it will be scanned into pt's chart.

## 2019-06-20 NOTE — LETTER
June 21, 2019        Alexey Almazan  [address]  [facility] [address]  Phone: [phone]  Fax: [phone]             [facility]  [address]  [facility] [address]  Phone: [phone]   Patient: Marko Oneill   MR Number: [id number]   YOB: 1970   Date of Visit: 6/20/2019       Dear Dr. Alexey Almazan    Thank you for referring Marko Oneill to me for evaluation. Attached you will find relevant portions of my assessment and plan of care.    If you have questions, please do not hesitate to call me. I look forward to following Marko Oneill along with you.    Sincerely,    Teo Pollock MD    Enclosure    If you would like to receive this communication electronically, please contact [email or web address] or [phone] to request [employer] access.    [employer] is a tool which provides read-only access to select patient information with whom you have a relationship. Its easy to use and provides real time access to review your patients record including encounter summaries, notes, results, and demographic information.    If you feel you have received this communication in error or would no longer like to receive these types of communications, please e-mail [email or web address]

## 2019-06-26 NOTE — TELEPHONE ENCOUNTER
Quantiferon gold drawn 5/16 and 6/20 both negative. Called and informed patient of results. No need for latent Tb treatment.

## 2019-07-15 NOTE — PROGRESS NOTES
Psychiatry clearance letter follow up.    Transplant  followed up with patient by telephone today [phone] regarding psychiatry clearance letter. Pt reports has met with his doctor Jr Dorado and psychiatry clearance letter has been requested. Pt was informed it has not been FAXed to transplant . Transplant  will reach out to transplant nurse coordinator to learn if it was sent to her instead.    Kamala [facility]bando [facility]

## 2019-07-15 NOTE — PROGRESS NOTES
Dialysis unit tel number: [phone]  Dialysis unit FAX number: [phone]  Nurse: Jay Jay    Dialysis compliance update form sent to unit for completion.    Kamala Obando [facility]

## 2019-07-16 NOTE — PROGRESS NOTES
Dialysis compliance update follow up.    [facility]     Dialysis unit tel number: [phone]  Dialysis unit FAX number: [phone]  Nurse: Jay Jay     7-15-19 Completed dialysis compliance update form shows patient having high dialysis compliance with 0 AMA, 0 no shows and 0 psychosocial issues.   Current dry weight: 100.5    Most Recent pre treatment weight 115.6 (no date provided)  Last intact  on 5-3-19  No concerns with labs, caregivers, transportation or psychosocial issues.     Kamala Obando MSW LCSW

## 2019-07-23 NOTE — PROGRESS NOTES
7-23-19 Epic message from transplant nurse.      ROBERTA Kim             No I have not received a psych clearance letter.    Previous Messages      ----- Message -----   From: Kamala Obando   Sent: 7/15/2019   2:50 PM   To: ROBERTA Kim, Did you happen to get a psychiatry clearance letter from this patient's doctor sent to you? It was supposed to go to me but I haven't received it. Let me know. Thank you!! Kamala

## 2019-08-12 NOTE — NURSING
Nurse spoke with the patient and informed him that his potassium was low 2.5. The patient states that he is aware and his local Nephrologist sent in a prescription. He will  the prescription today. Patient informed that we will fax a copy of the labs to his local nephrologist and dialysis unit. Patient verbalized understanding of the above information. All questions answered.

## 2019-08-12 NOTE — TELEPHONE ENCOUNTER
----- Message from Yamilet Meehan NP sent at 8/12/2019  2:07 PM CDT -----  Results reviewed, action needed.  Hypokalemia:   Has been Addressed

## 2019-08-13 NOTE — PROGRESS NOTES
Transplant  away from work past 2 weeks. This Epic message in in box open and read today 8-13-19.  Kamala Obando MSW LCSW      Iris Friedman, ROBERTA Wray,     No I did not. ThanksIris    Previous Messages      ----- Message -----   From: Kamala Obando   Sent: 7/15/2019   2:50 PM   To: ROBERTA Kim, Did you happen to get a psychiatry clearance letter from this patient's doctor sent to you? It was supposed to go to me but I haven't received it. Let me know. Thank you!! Kamala

## 2019-08-13 NOTE — PROGRESS NOTES
Light Iliac calcifications are present bilaterally. Likely not prohibitive.      Need repeat CT in 2 years if not transplanted.

## 2019-08-14 NOTE — PROGRESS NOTES
Psychiatry clearance letter follow up.      Transplant  followed up with patient by telephone today 8-14-19 at [phone] regarding psychiatry clearance letter. Pt reports has met with his doctor Jr Dorado and psychiatry clearance letter has been requested. Pt was informed it has not yet been FAXed to transplant  and transplant nurse coordinator does not have it either. Patient reports plan to contact Dr. Dorado office to have the psychiatry clearance letter sent to transplant team.     Kamala Obando MSW [facility]

## 2019-10-01 NOTE — TELEPHONE ENCOUNTER
----- Message from Reina Lazo sent at 9/30/2019  3:57 PM CDT -----  Contact: Self [phone]      ----- Message -----  From: Martine Garcia  Sent: 9/30/2019   2:11 PM CDT  To: [facility] Pre-Kidney Transplant Clinical    PT called to verify if he should report monthly weight to the clinic. He can be reached at [phone].

## 2019-10-01 NOTE — TELEPHONE ENCOUNTER
Call returned and he is advised to keep up the good work with weight loss and he does not need to call me monthly.

## 2019-10-31 NOTE — PROGRESS NOTES
10-31-19 Psychiatry clearance letter follow up.     Transplant  followed up with patient by telephone today [phone] regarding psychiatry clearance letter. Pt reports will request doctor Jr Dorado for psychiatry clearance letter to be FAXed to transplant  [phone]. Pt reports understanding psychiatry clearance is mandatory for organ transplant suitability clearance.     Kamala [facility]bando [facility]

## 2019-11-06 NOTE — TELEPHONE ENCOUNTER
SW contacted pt to inform him that we have received his clearance letter. NATY also informed pt that if there is any other psychosocial needs that he needs to follow up on that Kamala will contact him. Pt confirmed understanding.     SW remains available. SW did receive fax confirm pt has been cleared for his psychiatrist for transplant.     ----- Message from Wesley Covington sent at 11/6/2019 11:59 AM CST -----  Contact: Pt  Pt calling to see if fax was received.    Pt# [phone]

## 2019-11-08 NOTE — TELEPHONE ENCOUNTER
----- Message from Deacon Ramos sent at 11/8/2019  9:36 AM CST -----  Contact: Pt       ----- Message -----  From: Samantha Jordan  Sent: 11/8/2019   8:09 AM CST  To: [facility] Pre-Kidney Transplant Non-Clinical    Calling to speak w/ coordinator regarding cardiology and gastro clearance    Contact: [phone]

## 2019-11-13 NOTE — PROGRESS NOTES
Mental health clearance.    On 7-18-19, Dr. CARLA Carter M.D.cleared patient for kidney organ transplant regarding mental health issues.  Mental health letter placed in patient's chart for organ transplant team review. Please review below for full mental health clearance letter.    Psychosocial Suitability: Patient presents as an suitable candidate for kidney transplant at this time. Due to pt's significant mental health history and treatment for PTSD and need for continued mental health follow up with medical provider, mental health clearance letter was needed and provided by pt's local medical provider, Dr. Jr Carter. Pt reports having organ transplant caregiver/transportation plan, medical insurance plan, and plan to afford transplant costs all in place. Pt reports adherence to dialysis treatment and instructions.      Recommendations/Additional Comments: Pt cleared on 7-18-19 regarding mental health issues by PCP, Dr. Jr Carter.  Pt reports lives away and will need transplant lodging; lodging information provided in detail. Pt reports earning no income himself; reports depends on wife's earned income $8000 per month for all expenses.  Pt reports having [employer] policy benefits for post transplant needs. 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 Pt reports usually obtains out pt prescriptions from [facility] and reports is in agreement to using [facility] if insurance allows.     Final determination of transplant candidacy will be made once work up is complete and reviewed by the selection committee.     Kamala Obando [facility]W [facility]

## 2020-01-01 ENCOUNTER — TELEPHONE (OUTPATIENT)
Dept: TRANSPLANT | Facility: CLINIC | Age: 50
End: 2020-01-01

## 2020-01-29 NOTE — TELEPHONE ENCOUNTER
I attempted to call Mr Oneill however there was no answer and no voice mail pickup. I called and spoke with his wife who is not certain if Jeff ever saw a cardiologist. She relates he has been in and out of the hospital for the last 6 weeks with peritonitis and is now on hemodialysis. She is to speak with Jeff to see where he is with his cardiology and gastro clearance and let me know.

## 2020-04-08 NOTE — TELEPHONE ENCOUNTER
Called Mr Oneill to follow up on his GI clearance for transplant of which I have not received. I left a voice mail for return call.

## 2020-04-23 ENCOUNTER — TELEPHONE (OUTPATIENT)
Dept: TRANSPLANT | Facility: CLINIC | Age: 50
End: 2020-04-23

## 2020-04-23 ENCOUNTER — POST MORTEM DOCUMENTATION (OUTPATIENT)
Dept: TRANSPLANT | Facility: CLINIC | Age: 50
End: 2020-04-23

## 2020-04-23 NOTE — PROGRESS NOTES
I called Mr Oneill to follow up on his GI clearance and his wife answered his phone. She informed me that Jeff was in the hospital and had multiple arrests on yesterday and did not recover. I called and spoke with his doctors office and the report is that he had a small bowel obstruction and sepsis with multiple cardiac arrests.